# Patient Record
Sex: MALE | Race: WHITE | NOT HISPANIC OR LATINO | URBAN - METROPOLITAN AREA
[De-identification: names, ages, dates, MRNs, and addresses within clinical notes are randomized per-mention and may not be internally consistent; named-entity substitution may affect disease eponyms.]

---

## 2023-10-31 ENCOUNTER — APPOINTMENT (OUTPATIENT)
Dept: ENDOCRINOLOGY | Facility: CLINIC | Age: 33
End: 2023-10-31
Payer: COMMERCIAL

## 2023-11-22 ENCOUNTER — APPOINTMENT (OUTPATIENT)
Dept: ENDOCRINOLOGY | Facility: CLINIC | Age: 33
End: 2023-11-22
Payer: COMMERCIAL

## 2024-05-07 ENCOUNTER — TRANSCRIBE ORDERS (OUTPATIENT)
Dept: ENDOCRINOLOGY | Facility: CLINIC | Age: 34
End: 2024-05-07
Payer: COMMERCIAL

## 2024-05-07 DIAGNOSIS — Z31.41 FERTILITY TESTING: ICD-10-CM

## 2024-05-21 ENCOUNTER — ANCILLARY PROCEDURE (OUTPATIENT)
Dept: ENDOCRINOLOGY | Facility: CLINIC | Age: 34
End: 2024-05-21
Payer: COMMERCIAL

## 2024-05-21 DIAGNOSIS — Z31.41 FERTILITY TESTING: ICD-10-CM

## 2024-05-21 LAB
% EX RESIDUAL CYTOPLASM (SEMEN): 0.5 %
% HEAD DEFECTS (SEMEN): 94.8 %
% NECK MIDPIECE (SEMEN): 21.8 %
% NORMAL (SEMEN): 2.8 % (ref 4–?)
% TAIL DEFECTS (SEMEN): 14.8 %
ABSTINENCE (DAYS): 3 DAYS (ref 2–7)
AGGLUTINATION (SEMEN): NO
AMOUNT MISSED:: ABNORMAL
ANALYZED TIME:: ABNORMAL
ANDROLOGY LAB ID#: ABNORMAL
CLUMPS (SEMEN): NO
COLLECTED COMPLETELY: NO
COLLECTION LOCATION:: ABNORMAL
COLLECTION METHOD:: ABNORMAL
CONCENTRATION(SEMEN): 11.24 MILL/ML (ref 15–?)
DEBRIS (SEMEN): YES
LEUKOCYTE (SEMEN): POSITIVE
NON PROG. MOTILITY (SEMEN): 13 %
PORTION MISSED REI: ABNORMAL
PROG. MOTILITY (SEMEN): 17 % (ref 32–?)
RECEIVED TIME:: ABNORMAL
REI PARTNER DOB: ABNORMAL
REI PARTNER NAME: ABNORMAL
SEMEN APPEARANCE: NORMAL
SEMEN LIQUEFACTION: NORMAL
SEMEN VISCOSITY: ABNORMAL
TOT. NO OF NORM. MOTILE SPERM (SEMEN): 0.49 MILL
TOT. NO OF NORM. SPERM (SEMEN): 1.67 MILL
TOTAL MOTILITY (SEMEN): 30 % (ref 40–?)
TOTAL NO OF MOTILE (SEMEN): 17.9 MILL
TOTAL NO OF RND CELLS (SEMEN): 2.8 MILL (ref ?–5)
TOTAL NO OF SPERM (SEMEN): 60.67 MILL (ref 39–?)
VOLUME (SEMEN): 5.4 ML (ref 1.5–?)

## 2024-05-21 PROCEDURE — 89322 SEMEN ANAL STRICT CRITERIA: CPT | Performed by: OBSTETRICS & GYNECOLOGY

## 2024-09-19 ENCOUNTER — CONSULT (OUTPATIENT)
Dept: ENDOCRINOLOGY | Facility: CLINIC | Age: 34
End: 2024-09-19
Payer: COMMERCIAL

## 2024-09-19 VITALS
HEART RATE: 65 BPM | BODY MASS INDEX: 31.45 KG/M2 | SYSTOLIC BLOOD PRESSURE: 125 MMHG | DIASTOLIC BLOOD PRESSURE: 80 MMHG | WEIGHT: 212.38 LBS | HEIGHT: 69 IN

## 2024-09-19 DIAGNOSIS — Z11.3 ENCOUNTER FOR SCREENING EXAMINATION FOR SEXUALLY TRANSMITTED DISEASE: Primary | ICD-10-CM

## 2024-09-19 DIAGNOSIS — Z31.41 FERTILITY TESTING: ICD-10-CM

## 2024-09-19 PROCEDURE — 99212 OFFICE O/P EST SF 10 MIN: CPT | Performed by: OBSTETRICS & GYNECOLOGY

## 2024-09-19 PROCEDURE — 99202 OFFICE O/P NEW SF 15 MIN: CPT | Performed by: OBSTETRICS & GYNECOLOGY

## 2024-09-19 ASSESSMENT — PAIN SCALES - GENERAL: PAINLEVEL: 0-NO PAIN

## 2024-09-19 ASSESSMENT — COLUMBIA-SUICIDE SEVERITY RATING SCALE - C-SSRS
1. IN THE PAST MONTH, HAVE YOU WISHED YOU WERE DEAD OR WISHED YOU COULD GO TO SLEEP AND NOT WAKE UP?: NO
2. HAVE YOU ACTUALLY HAD ANY THOUGHTS OF KILLING YOURSELF?: NO
6. HAVE YOU EVER DONE ANYTHING, STARTED TO DO ANYTHING, OR PREPARED TO DO ANYTHING TO END YOUR LIFE?: NO

## 2024-09-19 ASSESSMENT — PATIENT HEALTH QUESTIONNAIRE - PHQ9
SUM OF ALL RESPONSES TO PHQ9 QUESTIONS 1 AND 2: 0
2. FEELING DOWN, DEPRESSED OR HOPELESS: NOT AT ALL
1. LITTLE INTEREST OR PLEASURE IN DOING THINGS: NOT AT ALL

## 2024-09-19 NOTE — PATIENT INSTRUCTIONS
ASSESSMENT   34 y.o. male presents with partner for infertility evaluation  SA: Oligospermia      PLAN  Orders Placed This Encounter   Procedures    Hepatitis B surface antigen    Hepatitis C antibody    HIV 1/2 Antigen/Antibody Screen with Reflex to Confirmation    Syphilis Screen with Reflex    C. Trachomatis / N. Gonorrhoeae, Amplified Detection    POCT Semen Analysis Complete with Strict Morphology       PARTNER  Yes Semen Analysis: Ordered  Yes Genetic screening: Waiver  STDs as above    FOLLOW UP   Follow up with partner for follow up visit as directed to review result and further management.   Discussed Male Vitamins as follows:  Co-Q10   200 mg daily  L-Carnitine 200-500 mg daily  Vitamin C 500 mg daily    Recommend repeat SA with 48-72 hours abstinence    Hilda Barrios  09/19/2024  11:04 AM

## 2024-09-19 NOTE — PROGRESS NOTES
NEW FERTILITY PATIENT VISIT- Male Partner    Partner information: Nicci Payne 1991     Zenaida Payne is a 34 y.o. male who presents with female partner for infertility evaluation       Brief history:   PARTNER HISTORY  Partner Name: Zenaida Payne  Partner : [id number]  Partner email: [email or web address]  Occupation: [employer]   Prior fertility history: Concerns about semen analysis, also problems staying erect  PMH: N/a  PSH: N/a  Smoking:Yes  -Nicotine patches- stopped smoking a few months ago  Alcohol Use: Yes  -A few times/week  Drug Use: Yes  -None recently   Medications: N/a  Injuries: No  STD: Yes  -None  Please select all that are applicable: herpes  SA: Yes  SA Results: Unsure    Volume (Semen)  1.5 mL 5.4   Concentration(Semen)  15 mill/mL 11.24 Abnormal    Total Motility (Semen)  40 % 30 Abnormal    Prog. Motility (Semen)  32 % 17 Abnormal    Non Prog. Motility (Semen)  % 13   Total No of Sperm (Semen)  39 mill 60.67   Total No of Motile (Semen)  mill 17.9   Total No of Rnd Cells (Semen)  5 mill 2.8   Leukocyte (Semen) Positive   % Normal (Semen)  4 % 2.8 Abnormal    % Head defects (Semen)  % 94.8   % Neck Midpiece (Semen)  % 21.8   % Tail defects (Semen)  % 14.8   % Ex Residual Cytoplasm (Semen)  % 0.5   Tot. No of Norm. Sperm (Semen)  mill 1.669   Tot. No of Norm. Motile Sperm (Semen)  mill 0.492             Prior Labs  Lab Results    Date Done      Hepatitis B surface antigen: No results found for requested labs within last 365 days. No results found for requested labs within last 365 days.   Hepatitis C antibody: No results found for requested labs within last 365 days. No results found for requested labs within last 365 days.   HIV ½ Antigen Antibody screen with reflex: No results found for requested labs within last 365 days. No results found for requested labs within last 365 days.   Syphilis screening with reflex: No results found for requested labs within last 365  "days. No results found for requested labs within last 365 days.   GC: No results found for requested labs within last 365 days. No results found for requested labs within last 365 days.   CT: No results found for requested labs within last 365 days. No results found for requested labs within last 365 days.      PMH  History reviewed. No pertinent past medical history.     MEDICATIONS  No current outpatient medications on file prior to visit.     No current facility-administered medications on file prior to visit.       PSH  History reviewed. No pertinent surgical history.       SOCIAL HISTORY  Social History     Tobacco Use    Smoking status: Never     Passive exposure: Never    Smokeless tobacco: Never   Substance Use Topics    Alcohol use: Yes    Drug use: Never         FAMILY HISTORY   No family history on file.     BMI:   BMI Readings from Last 1 Encounters:   09/19/24 31.36 kg/m²     VITALS:  /80   Pulse 65   Ht 1.753 m (5' 9\")   Wt 96.3 kg (212 lb 6 oz)   BMI 31.36 kg/m²       ASSESSMENT   34 y.o. male presents with partner for infertility evaluation  SA: Oligospermia      PLAN  Orders Placed This Encounter   Procedures    Hepatitis B surface antigen    Hepatitis C antibody    HIV 1/2 Antigen/Antibody Screen with Reflex to Confirmation    Syphilis Screen with Reflex    C. Trachomatis / N. Gonorrhoeae, Amplified Detection    POCT Semen Analysis Complete with Strict Morphology       PARTNER  Yes Semen Analysis: Ordered  Yes Genetic screening: Waiver  STDs as above    FOLLOW UP   Follow up with partner for follow up visit as directed to review result and further management.   Discussed Male Vitamins as follows:  Co-Q10   200 mg daily  L-Carnitine 200-500 mg daily  Vitamin C 500 mg daily    Recommend repeat SA with 48-72 hours abstinence    Hilda Barrios  09/19/2024  11:04 AM    "

## 2024-09-25 ENCOUNTER — ANCILLARY PROCEDURE (OUTPATIENT)
Dept: ENDOCRINOLOGY | Facility: CLINIC | Age: 34
End: 2024-09-25
Payer: COMMERCIAL

## 2024-09-25 DIAGNOSIS — Z11.3 ENCOUNTER FOR SCREENING EXAMINATION FOR SEXUALLY TRANSMITTED DISEASE: ICD-10-CM

## 2024-09-25 DIAGNOSIS — Z31.41 FERTILITY TESTING: ICD-10-CM

## 2024-09-25 LAB
% EX RESIDUAL CYTOPLASM (SEMEN): 0.3 %
% HEAD DEFECTS (SEMEN): 94.8 %
% NECK MIDPIECE (SEMEN): 22.8 %
% NORMAL (SEMEN): 5 % (ref 4–?)
% TAIL DEFECTS (SEMEN): 2 %
ABSTINENCE (DAYS): 3 DAYS (ref 2–7)
AGGLUTINATION (SEMEN): NO
ANALYZED TIME:: NORMAL
ANDROLOGY LAB ID#: NORMAL
CLUMPS (SEMEN): NO
COLLECTED COMPLETELY: YES
COLLECTION LOCATION:: NORMAL
COLLECTION METHOD:: NORMAL
CONCENTRATION(SEMEN): 29.19 MILL/ML (ref 15–?)
DEBRIS (SEMEN): YES
LEUKOCYTE (SEMEN): POSITIVE
NON PROG. MOTILITY (SEMEN): 9 %
PROG. MOTILITY (SEMEN): 76 % (ref 32–?)
RECEIVED TIME:: NORMAL
REI PARTNER DOB: NORMAL
REI PARTNER NAME: NORMAL
SEMEN APPEARANCE: NORMAL
SEMEN LIQUEFACTION: NORMAL
SEMEN VISCOSITY: NORMAL
TOT. NO OF NORM. MOTILE SPERM (SEMEN): 7.68 MILL
TOT. NO OF NORM. SPERM (SEMEN): 9.05 MILL
TOTAL MOTILITY (SEMEN): 85 % (ref 40–?)
TOTAL NO OF MOTILE (SEMEN): 153.64 MILL
TOTAL NO OF RND CELLS (SEMEN): 2.1 MILL (ref ?–5)
TOTAL NO OF SPERM (SEMEN): 180.99 MILL (ref 39–?)
VOLUME (SEMEN): 6.2 ML (ref 1.5–?)

## 2024-09-25 PROCEDURE — 87491 CHLMYD TRACH DNA AMP PROBE: CPT

## 2024-09-25 PROCEDURE — 86780 TREPONEMA PALLIDUM: CPT

## 2024-09-25 PROCEDURE — 86803 HEPATITIS C AB TEST: CPT

## 2024-09-25 PROCEDURE — 87340 HEPATITIS B SURFACE AG IA: CPT

## 2024-09-25 PROCEDURE — 89322 SEMEN ANAL STRICT CRITERIA: CPT | Performed by: OBSTETRICS & GYNECOLOGY

## 2024-09-25 PROCEDURE — 87591 N.GONORRHOEAE DNA AMP PROB: CPT

## 2024-09-25 PROCEDURE — 87389 HIV-1 AG W/HIV-1&-2 AB AG IA: CPT

## 2024-09-26 LAB
C TRACH RRNA SPEC QL NAA+PROBE: NEGATIVE
HBV SURFACE AG SERPL QL IA: NONREACTIVE
HCV AB SER QL: NONREACTIVE
HIV 1+2 AB+HIV1 P24 AG SERPL QL IA: NONREACTIVE
N GONORRHOEA DNA SPEC QL PROBE+SIG AMP: NEGATIVE
TREPONEMA PALLIDUM IGG+IGM AB [PRESENCE] IN SERUM OR PLASMA BY IMMUNOASSAY: NONREACTIVE

## 2025-06-16 ENCOUNTER — OFFICE VISIT (OUTPATIENT)
Dept: PRIMARY CARE | Facility: CLINIC | Age: 35
End: 2025-06-16
Payer: COMMERCIAL

## 2025-06-16 VITALS
SYSTOLIC BLOOD PRESSURE: 138 MMHG | DIASTOLIC BLOOD PRESSURE: 84 MMHG | HEIGHT: 69 IN | WEIGHT: 223 LBS | BODY MASS INDEX: 33.03 KG/M2

## 2025-06-16 DIAGNOSIS — L02.92 BOIL: Primary | ICD-10-CM

## 2025-06-16 DIAGNOSIS — B99.9 INFECTION: ICD-10-CM

## 2025-06-16 PROCEDURE — 3008F BODY MASS INDEX DOCD: CPT | Performed by: INTERNAL MEDICINE

## 2025-06-16 PROCEDURE — 99204 OFFICE O/P NEW MOD 45 MIN: CPT | Performed by: INTERNAL MEDICINE

## 2025-06-16 PROCEDURE — 1036F TOBACCO NON-USER: CPT | Performed by: INTERNAL MEDICINE

## 2025-06-16 RX ORDER — AMOXICILLIN AND CLAVULANATE POTASSIUM 875; 125 MG/1; MG/1
875 TABLET, FILM COATED ORAL 2 TIMES DAILY
Qty: 20 TABLET | Refills: 0 | Status: SHIPPED | OUTPATIENT
Start: 2025-06-16 | End: 2025-06-26

## 2025-06-16 RX ORDER — DOXYCYCLINE 100 MG/1
100 CAPSULE ORAL 2 TIMES DAILY
Qty: 20 CAPSULE | Refills: 0 | Status: SHIPPED | OUTPATIENT
Start: 2025-06-16 | End: 2025-07-06

## 2025-06-16 RX ORDER — NABUMENTONE 750 MG/1
750 TABLET ORAL 2 TIMES DAILY
Qty: 60 TABLET | Refills: 11 | Status: SHIPPED | OUTPATIENT
Start: 2025-06-16 | End: 2026-06-16

## 2025-06-17 NOTE — PROGRESS NOTES
"Subjective   Patient ID: Zenaida Payne is a 35 y.o. male who presents for Establish Care (Various conditions).    This 35-year-old white gentleman came to my office first time.  I welcomed him.  He told me he has pain and swelling in left shoulder for no good reason.  No fall, trauma, injury, but as a child he always got boils and it was treated.  Red, inflamed, tender.  Fever feeling for last two days, not feeling good, weak, tired, fatigued, exhausted.    PAST MEDICAL HISTORY: Normal childhood except boils.    I have personally reviewed the patient's Past Medical History, Medications, Allergies, Social History, and Family History in the EMR.    Review of Systems   All other systems reviewed and are negative.  No heart attack.  No stroke.  No diabetes.  No cancer.  Normal childhood.    Objective   /84   Ht 1.753 m (5' 9\")   Wt 101 kg (223 lb)   BMI 32.93 kg/m²     Physical Exam  Vitals reviewed.   HENT:      Right Ear: Tympanic membrane, ear canal and external ear normal.      Left Ear: Tympanic membrane, ear canal and external ear normal.   Eyes:      General: No scleral icterus.     Pupils: Pupils are equal, round, and reactive to light.   Neck:      Vascular: No carotid bruit.   Cardiovascular:      Heart sounds: Normal heart sounds, S1 normal and S2 normal. No murmur heard.     No friction rub.   Pulmonary:      Effort: Pulmonary effort is normal.      Breath sounds: Normal breath sounds and air entry.   Abdominal:      Palpations: There is no hepatomegaly, splenomegaly or mass.   Musculoskeletal:         General: No swelling or deformity. Normal range of motion.      Cervical back: Neck supple.      Right lower leg: No edema.      Left lower leg: No edema.      Comments: Left shoulder along the deltoid muscle and front of the chest very red, inflamed, tender, almost abscess, cellulitis present.  Local temperature ______.   Lymphadenopathy:      Cervical: No cervical adenopathy.      Upper Body:    "   Right upper body: No axillary adenopathy.      Left upper body: No axillary adenopathy.      Lower Body: No right inguinal adenopathy. No left inguinal adenopathy.   Neurological:      Mental Status: He is oriented to person, place, and time.      Cranial Nerves: Cranial nerves 2-12 are intact. No cranial nerve deficit.      Sensory: No sensory deficit.      Motor: Motor function is intact. No weakness.      Gait: Gait is intact.      Deep Tendon Reflexes: Reflexes normal.   Psychiatric:         Mood and Affect: Mood normal. Mood is not anxious or depressed. Affect is not angry.         Behavior: Behavior is not agitated.         Thought Content: Thought content normal.         Judgment: Judgment normal.     LAB WORK: Laboratory testing discussed.    Assessment/Plan   Problem List Items Addressed This Visit    None  Visit Diagnoses         Codes      Boil    -  Primary L02.92    Relevant Medications    amoxicillin-clavulanate (Augmentin) 875-125 mg tablet    doxycycline (Vibramycin) 100 mg capsule    nabumetone (Relafen) 750 mg tablet    Other Relevant Orders    CBC and Auto Differential    Comprehensive Metabolic Panel    Lipid Panel    Hemoglobin A1C    TSH with reflex to Free T4 if abnormal    Urinalysis with Reflex Culture and Microscopic      Infection     B99.9        1. Infection.  Soak in hot water, bacitracin.  I gave him Augmentin, doxycycline.  2. Recurrent infections on and off since childhood.  I am wondering whether we are dealing with complement deficiency, genetic issue.  Once it gets better, he will see the immunologist for workup and treatment.  3. Complete basic blood work ordered.  4. I shall see him back in a week.    Scribe Attestation  By signing my name below, ILoreto Scribe attest that this documentation has been prepared under the direction and in the presence of Tiara Black MD.     All medical record entries made by the valentine were personally dictated by me I have reviewed the  chart and agree the record accurately reflects my personal performance of his history physical examination and management

## 2025-06-19 LAB
ALBUMIN SERPL-MCNC: 4.4 G/DL (ref 3.6–5.1)
ALP SERPL-CCNC: 53 U/L (ref 36–130)
ALT SERPL-CCNC: 111 U/L (ref 9–46)
ANION GAP SERPL CALCULATED.4IONS-SCNC: 10 MMOL/L (CALC) (ref 7–17)
APPEARANCE UR: CLEAR
AST SERPL-CCNC: 57 U/L (ref 10–40)
BACTERIA #/AREA URNS HPF: ABNORMAL /HPF
BACTERIA UR CULT: ABNORMAL
BASOPHILS # BLD AUTO: 41 CELLS/UL (ref 0–200)
BASOPHILS NFR BLD AUTO: 0.9 %
BILIRUB SERPL-MCNC: 0.5 MG/DL (ref 0.2–1.2)
BILIRUB UR QL STRIP: NEGATIVE
BUN SERPL-MCNC: 17 MG/DL (ref 7–25)
CALCIUM SERPL-MCNC: 9.2 MG/DL (ref 8.6–10.3)
CHLORIDE SERPL-SCNC: 103 MMOL/L (ref 98–110)
CHOLEST SERPL-MCNC: 249 MG/DL
CHOLEST/HDLC SERPL: 6.7 (CALC)
CO2 SERPL-SCNC: 25 MMOL/L (ref 20–32)
COLOR UR: YELLOW
CREAT SERPL-MCNC: 0.95 MG/DL (ref 0.6–1.26)
EGFRCR SERPLBLD CKD-EPI 2021: 108 ML/MIN/1.73M2
EOSINOPHIL # BLD AUTO: 120 CELLS/UL (ref 15–500)
EOSINOPHIL NFR BLD AUTO: 2.6 %
ERYTHROCYTE [DISTWIDTH] IN BLOOD BY AUTOMATED COUNT: 12.6 % (ref 11–15)
EST. AVERAGE GLUCOSE BLD GHB EST-MCNC: 126 MG/DL
EST. AVERAGE GLUCOSE BLD GHB EST-SCNC: 7 MMOL/L
GLUCOSE SERPL-MCNC: 122 MG/DL (ref 65–99)
GLUCOSE UR QL STRIP: NEGATIVE
HBA1C MFR BLD: 6 %
HCT VFR BLD AUTO: 43.9 % (ref 38.5–50)
HDLC SERPL-MCNC: 37 MG/DL
HGB BLD-MCNC: 14.7 G/DL (ref 13.2–17.1)
HGB UR QL STRIP: NEGATIVE
HYALINE CASTS #/AREA URNS LPF: ABNORMAL /LPF
KETONES UR QL STRIP: NEGATIVE
LDLC SERPL CALC-MCNC: 171 MG/DL (CALC)
LEUKOCYTE ESTERASE UR QL STRIP: NEGATIVE
LYMPHOCYTES # BLD AUTO: 2240 CELLS/UL (ref 850–3900)
LYMPHOCYTES NFR BLD AUTO: 48.7 %
MCH RBC QN AUTO: 32 PG (ref 27–33)
MCHC RBC AUTO-ENTMCNC: 33.5 G/DL (ref 32–36)
MCV RBC AUTO: 95.4 FL (ref 80–100)
MONOCYTES # BLD AUTO: 529 CELLS/UL (ref 200–950)
MONOCYTES NFR BLD AUTO: 11.5 %
NEUTROPHILS # BLD AUTO: 1670 CELLS/UL (ref 1500–7800)
NEUTROPHILS NFR BLD AUTO: 36.3 %
NITRITE UR QL STRIP: NEGATIVE
NONHDLC SERPL-MCNC: 212 MG/DL (CALC)
PH UR STRIP: 6 [PH] (ref 5–8)
PLATELET # BLD AUTO: 222 THOUSAND/UL (ref 140–400)
PMV BLD REES-ECKER: 9.6 FL (ref 7.5–12.5)
POTASSIUM SERPL-SCNC: 4.1 MMOL/L (ref 3.5–5.3)
PROT SERPL-MCNC: 7.1 G/DL (ref 6.1–8.1)
PROT UR QL STRIP: ABNORMAL
RBC # BLD AUTO: 4.6 MILLION/UL (ref 4.2–5.8)
RBC #/AREA URNS HPF: ABNORMAL /HPF
SERVICE CMNT-IMP: ABNORMAL
SODIUM SERPL-SCNC: 138 MMOL/L (ref 135–146)
SP GR UR STRIP: 1.03 (ref 1–1.03)
SQUAMOUS #/AREA URNS HPF: ABNORMAL /HPF
TRIGL SERPL-MCNC: 249 MG/DL
TSH SERPL-ACNC: 4.08 MIU/L (ref 0.4–4.5)
WBC # BLD AUTO: 4.6 THOUSAND/UL (ref 3.8–10.8)
WBC #/AREA URNS HPF: ABNORMAL /HPF

## 2025-06-25 ENCOUNTER — OFFICE VISIT (OUTPATIENT)
Dept: PRIMARY CARE | Facility: CLINIC | Age: 35
End: 2025-06-25
Payer: COMMERCIAL

## 2025-06-25 VITALS
HEIGHT: 69 IN | BODY MASS INDEX: 32.76 KG/M2 | SYSTOLIC BLOOD PRESSURE: 136 MMHG | WEIGHT: 221.2 LBS | DIASTOLIC BLOOD PRESSURE: 82 MMHG

## 2025-06-25 DIAGNOSIS — E78.00 PURE HYPERCHOLESTEROLEMIA: Primary | ICD-10-CM

## 2025-06-25 DIAGNOSIS — R73.03 PREDIABETES: ICD-10-CM

## 2025-06-25 PROCEDURE — 99213 OFFICE O/P EST LOW 20 MIN: CPT | Performed by: INTERNAL MEDICINE

## 2025-06-25 PROCEDURE — 3008F BODY MASS INDEX DOCD: CPT | Performed by: INTERNAL MEDICINE

## 2025-06-26 NOTE — PROGRESS NOTES
"Subjective   Patient ID: Zenaida Payne is a 35 y.o. male who presents for Follow-up.    This gentleman Kerry today came here for follow-up on various conditions.  Overall, he is a happy person.  Appetite and weight are okay.  No chest pain.  Taking medications regularly.  No side effects.  He came for follow-up.    I have personally reviewed the patient's Past Medical History, Medications, Allergies, Social History, and Family History in the EMR.    Review of Systems   All other systems reviewed and are negative.    Objective   /82   Ht 1.753 m (5' 9\")   Wt 100 kg (221 lb 3.2 oz)   BMI 32.67 kg/m²     Physical Exam  Vitals reviewed.   Cardiovascular:      Heart sounds: Normal heart sounds, S1 normal and S2 normal. No murmur heard.     No friction rub.   Pulmonary:      Effort: Pulmonary effort is normal.      Breath sounds: Normal breath sounds and air entry.   Abdominal:      Palpations: There is no hepatomegaly, splenomegaly or mass.   Musculoskeletal:      Right lower leg: No edema.      Left lower leg: No edema.   Lymphadenopathy:      Lower Body: No right inguinal adenopathy. No left inguinal adenopathy.   Neurological:      Cranial Nerves: Cranial nerves 2-12 are intact.      Sensory: No sensory deficit.      Motor: Motor function is intact.      Deep Tendon Reflexes: Reflexes are normal and symmetric.     LAB WORK: Laboratory testing discussed.    Assessment/Plan   Problem List Items Addressed This Visit    None  Visit Diagnoses         Codes      Pure hypercholesterolemia    -  Primary E78.00    Relevant Orders    Comprehensive Metabolic Panel    Hemoglobin A1C    Lipid Panel    TSH with reflex to Free T4 if abnormal      Prediabetes     R73.03        1. Cholesterol.  Diet, exercise.  2. Pre-diabetic/diabetic.  Check hemoglobin A1c.  3. Status post infection, good response.  4. He works in [employer], exposed to lot of sun.  Advised sunscreen.  Monitor.  5. I urged him to see me in middle of " September with repeat blood work.  6. I thanked him for his visit.  9. I told him impending danger of diabetes and cholesterol and to be careful.    Valentine Attestation  By signing my name below, I, Valentine Sanders attest that this documentation has been prepared under the direction and in the presence of Tiara Black MD.     All medical record entries made by the valentine were personally dictated by me I have reviewed the chart and agree the record accurately reflects my personal performance of his history physical examination and management